# Patient Record
Sex: FEMALE | Race: ASIAN | NOT HISPANIC OR LATINO | URBAN - METROPOLITAN AREA
[De-identification: names, ages, dates, MRNs, and addresses within clinical notes are randomized per-mention and may not be internally consistent; named-entity substitution may affect disease eponyms.]

---

## 2020-06-03 ENCOUNTER — OFFICE VISIT (OUTPATIENT)
Dept: URBAN - METROPOLITAN AREA CLINIC 78 | Facility: CLINIC | Age: 40
End: 2020-06-03

## 2020-07-01 ENCOUNTER — OFFICE VISIT (OUTPATIENT)
Dept: URBAN - METROPOLITAN AREA CLINIC 78 | Facility: CLINIC | Age: 40
End: 2020-07-01

## 2020-07-22 ENCOUNTER — OFFICE VISIT (OUTPATIENT)
Dept: URBAN - METROPOLITAN AREA CLINIC 78 | Facility: CLINIC | Age: 40
End: 2020-07-22

## 2020-08-14 ENCOUNTER — OFFICE VISIT (OUTPATIENT)
Dept: URBAN - METROPOLITAN AREA TELEHEALTH 2 | Facility: TELEHEALTH | Age: 40
End: 2020-08-14
Payer: COMMERCIAL

## 2020-08-14 DIAGNOSIS — K74.3 PRIMARY BILIARY CIRRHOSIS: ICD-10-CM

## 2020-08-14 DIAGNOSIS — M35.8 OVERLAP SYNDROME: ICD-10-CM

## 2020-08-14 PROCEDURE — 99214 OFFICE O/P EST MOD 30 MIN: CPT | Performed by: INTERNAL MEDICINE

## 2020-08-14 RX ORDER — TRANEXAMIC ACID 650 MG/1
TABLET ORAL
Qty: 0 | Refills: 0 | Status: ON HOLD | COMMUNITY
Start: 1900-01-01

## 2020-08-14 RX ORDER — TRANEXAMIC ACID 650 MG/1
TABLET ORAL
OUTPATIENT
Start: 1900-01-01

## 2020-08-14 RX ORDER — TURMERIC 400 MG
CAPSULE ORAL
Qty: 0 | Refills: 0 | Status: ACTIVE | COMMUNITY
Start: 1900-01-01

## 2020-08-14 RX ORDER — TURMERIC 400 MG
CAPSULE ORAL
OUTPATIENT
Start: 1900-01-01

## 2020-08-14 NOTE — HPI-TODAY'S VISIT:
Patient is here for routine f/u Taking Becky ( 6-70 % compliance )  Denies pruritis Denies rashes  Has 5 lbs weigh loss intentional Denies ETOH use but would like to partake for her Birthday    DATA  Helicobacter eradictaion is confirmed Labs 11/22/19 CMP : Alk PO 4 < 186 <216 < 348 AST 45 < 53 < 125 ALT < 38 <51 < 130 Liver biopsy: Overlaps syndrome Reortedly Bone density normal with PCP Labs dated 9 /7/19 Glucose 95 BUN 11 creatinine 0.9 alkaline phosphatase 348   Transferrin saturation 12% Immunoglobulin G 1924 mg/dL Immunoglobulin M 516 mg/dL INR 1.0 Alpha-fetoprotein 3.3 Actin antibody 20 units, weakly positive Antimitochondrial antibody 170 Units Anti LKM 1.3 AntiSLA 1.2 SAMMY positive ( anti Sjogrens SS ) 4.1 ( elevated ) RUQ US BM once a day

## 2021-03-22 ENCOUNTER — TELEPHONE ENCOUNTER (OUTPATIENT)
Dept: URBAN - METROPOLITAN AREA CLINIC 78 | Facility: CLINIC | Age: 41
End: 2021-03-22

## 2021-03-22 RX ORDER — URSODIOL 250 MG/1
ORAL TABLET ORAL TID
Qty: 270 | Refills: 0
End: 2021-06-21

## 2021-06-30 ENCOUNTER — TELEPHONE ENCOUNTER (OUTPATIENT)
Dept: URBAN - METROPOLITAN AREA CLINIC 78 | Facility: CLINIC | Age: 41
End: 2021-06-30

## 2021-06-30 RX ORDER — URSODIOL 250 MG/1
ORAL TID TABLET ORAL
Qty: 210 TABLET | Refills: 1

## 2021-10-22 ENCOUNTER — TELEPHONE ENCOUNTER (OUTPATIENT)
Dept: URBAN - METROPOLITAN AREA CLINIC 78 | Facility: CLINIC | Age: 41
End: 2021-10-22

## 2021-10-22 RX ORDER — URSODIOL 250 MG/1
1 TABLET ORAL TID
Qty: 210 TABLET | Refills: 3

## 2021-11-01 ENCOUNTER — TELEPHONE ENCOUNTER (OUTPATIENT)
Dept: URBAN - METROPOLITAN AREA CLINIC 40 | Facility: CLINIC | Age: 41
End: 2021-11-01

## 2021-12-02 ENCOUNTER — OFFICE VISIT (OUTPATIENT)
Dept: URBAN - METROPOLITAN AREA CLINIC 78 | Facility: CLINIC | Age: 41
End: 2021-12-02
Payer: COMMERCIAL

## 2021-12-02 VITALS
HEIGHT: 62 IN | DIASTOLIC BLOOD PRESSURE: 94 MMHG | WEIGHT: 123.2 LBS | SYSTOLIC BLOOD PRESSURE: 152 MMHG | HEART RATE: 76 BPM | TEMPERATURE: 97.6 F | BODY MASS INDEX: 22.67 KG/M2 | RESPIRATION RATE: 16 BRPM

## 2021-12-02 DIAGNOSIS — E53.8 VITAMIN B 12 DEFICIENCY: ICD-10-CM

## 2021-12-02 DIAGNOSIS — K74.3 PRIMARY BILIARY CIRRHOSIS: ICD-10-CM

## 2021-12-02 DIAGNOSIS — N92.0 MENORRHAGIA WITH REGULAR CYCLE: ICD-10-CM

## 2021-12-02 DIAGNOSIS — D50.0 IRON DEFICIENCY ANEMIA DUE TO CHRONIC BLOOD LOSS: ICD-10-CM

## 2021-12-02 DIAGNOSIS — R74.8 ELEVATED ALKALINE PHOSPHATASE LEVEL: ICD-10-CM

## 2021-12-02 PROCEDURE — 99214 OFFICE O/P EST MOD 30 MIN: CPT | Performed by: INTERNAL MEDICINE

## 2021-12-02 RX ORDER — OBETICHOLIC ACID 5 MG/1
1 TABLET TABLET, FILM COATED ORAL ONCE A DAY
Qty: 90 | Refills: 0 | OUTPATIENT

## 2021-12-02 RX ORDER — URSODIOL 250 MG/1
1 TABLET ORAL TID
Qty: 210 TABLET | Refills: 3

## 2021-12-02 RX ORDER — URSODIOL 250 MG/1
1 TABLET ORAL TID
Qty: 210 TABLET | Refills: 3 | Status: ACTIVE | COMMUNITY

## 2021-12-02 RX ORDER — TRANEXAMIC ACID 650 MG/1
TABLET ORAL
Status: ACTIVE | COMMUNITY
Start: 1900-01-01

## 2021-12-02 RX ORDER — TURMERIC 400 MG
CAPSULE ORAL
Status: ACTIVE | COMMUNITY
Start: 1900-01-01

## 2021-12-02 NOTE — HPI-TODAY'S VISIT:
Patient is here for routine f/u  Last month she saw her PCP for annual exams   Labs done by PCP : 11/1/2021  Hg 10.1 MCV 74  RDW 16.2  TB normal  ALT 34 < 38 <51 < 130  Alk PO4 152 < 186 <216 < 348  Vitamin D is good 65.7 Hg A 1 c 5.6 % Vit B 12 280   PCP gave her Vit B 12 shot and is currently not taking iron  SHe has Menorrhagia which is managing by Tranzemic acid    Taking Becky takes 2 in am and one in evening ( compliance is above 80 % )  Mild itching  Denies rashes  Denies ETOH use  Denies rectal bleeding  Denies change in bowel habits   Patient is seeing cardiology for abn EKG and will notify us  of any findings    DATA  Helicobacter eradictaion is confirmed Labs 11/22/19 CMP : Alk PO 4 < 186 <216 < 348 AST 45 < 53 < 125 ALT < 38 <51 < 130 Liver biopsy: Overlaps syndrome Reortedly Bone density normal with PCP Labs dated 9 /7/19 Glucose 95 BUN 11 creatinine 0.9 alkaline phosphatase 348   Transferrin saturation 12% Immunoglobulin G 1924 mg/dL Immunoglobulin M 516 mg/dL INR 1.0 Alpha-fetoprotein 3.3 Actin antibody 20 units, weakly positive Antimitochondrial antibody 170 Units Anti LKM 1.3 AntiSLA 1.2 SAMMY positive ( anti Sjogrens SS ) 4.1 ( elevated ) RUQ US BM once a day

## 2022-06-06 ENCOUNTER — TELEPHONE ENCOUNTER (OUTPATIENT)
Dept: URBAN - METROPOLITAN AREA CLINIC 78 | Facility: CLINIC | Age: 42
End: 2022-06-06

## 2022-06-13 ENCOUNTER — OFFICE VISIT (OUTPATIENT)
Dept: URBAN - METROPOLITAN AREA CLINIC 78 | Facility: CLINIC | Age: 42
End: 2022-06-13
Payer: COMMERCIAL

## 2022-06-13 ENCOUNTER — LAB OUTSIDE AN ENCOUNTER (OUTPATIENT)
Dept: URBAN - METROPOLITAN AREA CLINIC 78 | Facility: CLINIC | Age: 42
End: 2022-06-13

## 2022-06-13 ENCOUNTER — DASHBOARD ENCOUNTERS (OUTPATIENT)
Age: 42
End: 2022-06-13

## 2022-06-13 VITALS
RESPIRATION RATE: 16 BRPM | DIASTOLIC BLOOD PRESSURE: 97 MMHG | HEIGHT: 62 IN | WEIGHT: 120.8 LBS | BODY MASS INDEX: 22.23 KG/M2 | SYSTOLIC BLOOD PRESSURE: 148 MMHG | HEART RATE: 85 BPM | TEMPERATURE: 98.1 F

## 2022-06-13 DIAGNOSIS — N92.0 MENORRHAGIA WITH REGULAR CYCLE: ICD-10-CM

## 2022-06-13 DIAGNOSIS — D50.0 IRON DEFICIENCY ANEMIA DUE TO CHRONIC BLOOD LOSS: ICD-10-CM

## 2022-06-13 DIAGNOSIS — K74.3 PRIMARY BILIARY CIRRHOSIS: ICD-10-CM

## 2022-06-13 DIAGNOSIS — R74.8 ELEVATED ALKALINE PHOSPHATASE LEVEL: ICD-10-CM

## 2022-06-13 DIAGNOSIS — E53.8 VITAMIN B 12 DEFICIENCY: ICD-10-CM

## 2022-06-13 PROBLEM — 31712002 PRIMARY BILIARY CIRRHOSIS: Status: ACTIVE | Noted: 2020-08-14

## 2022-06-13 PROBLEM — 724556004: Status: ACTIVE | Noted: 2021-12-02

## 2022-06-13 PROBLEM — 386692008: Status: ACTIVE | Noted: 2021-12-02

## 2022-06-13 PROCEDURE — 99213 OFFICE O/P EST LOW 20 MIN: CPT | Performed by: INTERNAL MEDICINE

## 2022-06-13 RX ORDER — OBETICHOLIC ACID 5 MG/1
1 TABLET TABLET, FILM COATED ORAL ONCE A DAY
Qty: 90 | Refills: 0 | Status: ACTIVE | COMMUNITY

## 2022-06-13 RX ORDER — OBETICHOLIC ACID 5 MG/1
1 TABLET TABLET, FILM COATED ORAL ONCE A DAY
Qty: 90 | Refills: 0

## 2022-06-13 RX ORDER — URSODIOL 250 MG/1
1 TABLET ORAL TID
Qty: 210 TABLET | Refills: 3 | Status: ACTIVE | COMMUNITY

## 2022-06-13 RX ORDER — TURMERIC 400 MG
CAPSULE ORAL
Status: ACTIVE | COMMUNITY
Start: 1900-01-01

## 2022-06-13 RX ORDER — URSODIOL 250 MG/1
1 TABLET ORAL TID
Qty: 210 TABLET | Refills: 3

## 2022-06-13 RX ORDER — TRANEXAMIC ACID 650 MG/1
TABLET ORAL
Status: ACTIVE | COMMUNITY
Start: 1900-01-01

## 2022-06-13 NOTE — HPI-TODAY'S VISIT:
Patient is here for routine f/u  Patient reports headache and dizziness . She admits to stress .  Her BP is bothering her  Denies NSAID use   She never picked up [place] currently on  Ursodiol ( 3 a day )  She did her labs today , results   Denies itching or jaudice  Weight is stable  Reported her anemia responded to therapy via Gyne   Has not scheduled exercise stress test etc via Cardiologist       PREVIOUS ENCOUNTER:  Labs done by PCP : 11/1/2021  Hg 10.1 MCV 74  RDW 16.2  TB normal  ALT 34 < 38 <51 < 130  Alk PO4 152 < 186 <216 < 348  Vitamin D is good 65.7 Hg A 1 c 5.6 % Vit B 12 280   PCP gave her Vit B 12 shot and is currently not taking iron  SHe has Menorrhagia which is managing by Tranzemic acid    Taking Becky takes 2 in am and one in evening ( compliance is above 80 % )  Mild itching  Denies rashes  Denies ETOH use  Denies rectal bleeding  Denies change in bowel habits   Patient is seeing cardiology for abn EKG and will notify us  of any findings    DATA  Helicobacter eradictaion is confirmed Labs 11/22/19 CMP : Alk PO 4 < 186 <216 < 348 AST 45 < 53 < 125 ALT < 38 <51 < 130 Liver biopsy: Overlaps syndrome Reortedly Bone density normal with PCP Labs dated 9 /7/19 Glucose 95 BUN 11 creatinine 0.9 alkaline phosphatase 348   Transferrin saturation 12% Immunoglobulin G 1924 mg/dL Immunoglobulin M 516 mg/dL INR 1.0 Alpha-fetoprotein 3.3 Actin antibody 20 units, weakly positive Antimitochondrial antibody 170 Units Anti LKM 1.3 AntiSLA 1.2 SAMMY positive ( anti Sjogrens SS ) 4.1 ( elevated ) RUQ US BM once a day

## 2022-06-14 LAB
A/G RATIO: 1.7
ALBUMIN: 5
ALKALINE PHOSPHATASE: 154
ALT (SGPT): 25
AST (SGOT): 29
BILIRUBIN, TOTAL: 0.4
BUN/CREATININE RATIO: 11
BUN: 11
CALCIUM: 10
CARBON DIOXIDE, TOTAL: 21
CHLORIDE: 101
CREATININE: 1
EGFR: 73
GLOBULIN, TOTAL: 3
GLUCOSE: 74
POTASSIUM: 4.2
PROTEIN, TOTAL: 8
SODIUM: 140

## 2022-08-01 ENCOUNTER — TELEPHONE ENCOUNTER (OUTPATIENT)
Dept: URBAN - METROPOLITAN AREA CLINIC 78 | Facility: CLINIC | Age: 42
End: 2022-08-01

## 2022-08-01 RX ORDER — URSODIOL 250 MG/1
ORAL TID TABLET ORAL TID
Qty: 210 TABLET | Refills: 3

## 2022-12-11 ENCOUNTER — P2P PATIENT RECORD (OUTPATIENT)
Age: 42
End: 2022-12-11

## 2023-09-14 ENCOUNTER — TELEPHONE ENCOUNTER (OUTPATIENT)
Dept: URBAN - METROPOLITAN AREA CLINIC 78 | Facility: CLINIC | Age: 43
End: 2023-09-14

## 2023-09-14 RX ORDER — URSODIOL 250 MG/1
ONE TABLET ORAL THREE TIMES A DAY
Qty: 180 | Refills: 3
End: 2024-09-08

## 2024-02-28 NOTE — PHYSICAL EXAM CHEST:
breathing is un-labored without accessory muscle use, normal breath sounds   instruction in home exercise program   therapeutic exercise   therapeutic activity   neuromuscular re-education   manual therapy   mechanical traction     The following CPT codes are likely to be used in the care of this patient:   57792 PT Evaluation: High Complexity   96167 Therapeutic Exercise   42408 Neuromuscular Re-Education   39060 Therapeutic Activities   49718 Manual Therapy   56771 Mechanical Traction     Suggested Professional Referral: [x] No  [] Yes:     Patient Education:  [x] Plans / Goals, Risks / Benefits discussed  [x] Home exercise program  Method of Education: [x] Verbal  [x] Demo  [x] Written  Comprehension of Education:  [x] Verbalizes understanding.  [x] Demonstrates understanding.  [] Needs Review.  [] Demonstrates / verbalizes understanding of HEP / Ed previously given.    Frequency:  1-2 days per week for 4-6 weeks    Patient understands diagnosis/prognosis and consents to treatment, plan and goals: [x] Yes    [] No     Thank you for the opportunity to work with your patient.  If you have questions or comments, please contact me at [phone]; fax: [phone].    Electronically signed by: David Beckham, PT         Please sign Physician's Certification and return to:  [facility] PHYSICAL THERAPY  [facility]  [place] [address]  Dept: [phone]  Dept Fax: [phone]  Loc: [phone]    Physician's Certification / Comments     Frequency/Duration 1-2 days per week for 4-6 weeks.   Certification period from 2/28/2024  to 5/20/2024.    I have reviewed the Plan of Care established for skilled therapy services and certify that the services are required and that they will be provided while the patient is under my care.    Physician's Comments/Revisions:               Physician's Printed Name:                                           Physician's Signature:                                                               Date:

## 2025-04-14 ENCOUNTER — OFFICE VISIT (OUTPATIENT)
Dept: URBAN - METROPOLITAN AREA CLINIC 78 | Facility: CLINIC | Age: 45
End: 2025-04-14
Payer: COMMERCIAL

## 2025-04-14 DIAGNOSIS — E53.8 VITAMIN B 12 DEFICIENCY: ICD-10-CM

## 2025-04-14 DIAGNOSIS — Z12.11 COLON CANCER SCREENING: ICD-10-CM

## 2025-04-14 DIAGNOSIS — D50.0 IRON DEFICIENCY ANEMIA DUE TO CHRONIC BLOOD LOSS: ICD-10-CM

## 2025-04-14 DIAGNOSIS — K74.3 PRIMARY BILIARY CIRRHOSIS: ICD-10-CM

## 2025-04-14 PROCEDURE — 99214 OFFICE O/P EST MOD 30 MIN: CPT | Performed by: INTERNAL MEDICINE

## 2025-04-14 RX ORDER — OBETICHOLIC ACID 5 MG/1
1 TABLET TABLET, FILM COATED ORAL ONCE A DAY
Qty: 90 | Refills: 0 | Status: ON HOLD | COMMUNITY

## 2025-04-14 RX ORDER — TRANEXAMIC ACID 650 MG/1
TABLET ORAL
Status: ACTIVE | COMMUNITY
Start: 1900-01-01

## 2025-04-14 RX ORDER — TURMERIC 400 MG
CAPSULE ORAL
Status: ACTIVE | COMMUNITY
Start: 1900-01-01

## 2025-04-14 RX ORDER — URSODIOL 300 MG/1
1 CAPSULE CAPSULE ORAL THREE TIMES A DAY
Qty: 270 CAPSULE | Refills: 3 | OUTPATIENT
Start: 2025-04-14

## 2025-04-14 NOTE — PHYSICAL EXAM HENT:
Head,  normocephalic,  atraumatic,  Face,  Face within normal limits,  Ears,  External ears within normal limits,  Nose/Nasopharynx,  External nose  normal appearance,Mouth and Throat,  Oral cavity appearance normal,,  Lips,  Appearance normal none

## 2025-04-14 NOTE — HPI-TODAY'S VISIT:
Patient is here for routine f/u   Labs 3/13/24  AST 32 ALT 28  Alk PO4 146 ( ULN )   Reports weight gain 131 , currently on Becky ) patient is on 1.5 gm Becky prescribed by PCP      Denies itching or jaudice Menorrhagia is controlled on Transanemic acid   Denies stress test  Has been recommended BP meds by PCP      PREVIOUS ENCOUNTER:  Labs done by PCP : 11/1/2021  Hg 10.1 MCV 74  RDW 16.2  TB normal  ALT 34 < 38 <51 < 130  Alk PO4 152 < 186 <216 < 348  Vitamin D is good 65.7 Hg A 1 c 5.6 % Vit B 12 280   PCP gave her Vit B 12 shot and is currently not taking iron  SHe has Menorrhagia which is managing by Tranzemic acid    Taking Becky takes 2 in am and one in evening ( compliance is above 80 % )  Mild itching  Denies rashes  Denies ETOH use  Denies rectal bleeding  Denies change in bowel habits   Patient is seeing cardiology for abn EKG and will notify us  of any findings    DATA  Helicobacter eradictaion is confirmed Labs 11/22/19 CMP : Alk PO 4 < 186 <216 < 348 AST 45 < 53 < 125 ALT < 38 <51 < 130 Liver biopsy: Overlaps syndrome Reortedly Bone density normal with PCP Labs dated 9 /7/19 Glucose 95 BUN 11 creatinine 0.9 alkaline phosphatase 348   Transferrin saturation 12% Immunoglobulin G 1924 mg/dL Immunoglobulin M 516 mg/dL INR 1.0 Alpha-fetoprotein 3.3 Actin antibody 20 units, weakly positive Antimitochondrial antibody 170 Units Anti LKM 1.3 AntiSLA 1.2 SAMMY positive ( anti Sjogrens SS ) 4.1 ( elevated ) RUQ US BM once a day

## 2025-04-28 ENCOUNTER — P2P PATIENT RECORD (OUTPATIENT)
Age: 45
End: 2025-04-28

## 2025-07-23 ENCOUNTER — TELEPHONE ENCOUNTER (OUTPATIENT)
Dept: URBAN - METROPOLITAN AREA CLINIC 78 | Facility: CLINIC | Age: 45
End: 2025-07-23

## 2025-07-23 RX ORDER — URSODIOL 300 MG/1
1 CAPSULE CAPSULE ORAL THREE TIMES A DAY
Qty: 270 CAPSULE | Refills: 3
Start: 2025-04-14